# Patient Record
Sex: FEMALE | Race: WHITE | NOT HISPANIC OR LATINO | URBAN - METROPOLITAN AREA
[De-identification: names, ages, dates, MRNs, and addresses within clinical notes are randomized per-mention and may not be internally consistent; named-entity substitution may affect disease eponyms.]

---

## 2023-11-30 DIAGNOSIS — Z30.41 USES ORAL CONTRACEPTIVES: Primary | ICD-10-CM

## 2023-11-30 RX ORDER — DESOGESTREL/ETHINYL ESTRADIOL AND ETHINYL ESTRADIOL 21-5 (28)
KIT ORAL
Qty: 28 TABLET | Refills: 11 | Status: SHIPPED | OUTPATIENT
Start: 2023-11-30 | End: 2024-03-13 | Stop reason: WASHOUT

## 2024-02-12 ENCOUNTER — TELEPHONE (OUTPATIENT)
Dept: OBSTETRICS AND GYNECOLOGY | Facility: CLINIC | Age: 34
End: 2024-02-12
Payer: COMMERCIAL

## 2024-02-12 DIAGNOSIS — R10.2 PELVIC PAIN: ICD-10-CM

## 2024-02-12 NOTE — TELEPHONE ENCOUNTER
Est pt last seen 07/06/2022 Annual / pt calling with c/o left sie pelvic pain  / pt states that in Dec was seen in ER for right side pelvic pain / was told she had Rt ovarian cysts / pain subsided / Last week on menses had left side pelvic pain rates 7/10 some relief with heating pad and Tylenol was on menses 02/06-02/09 pain decreased after menses over now just a dull ache / advised message to Dr Fowler

## 2024-02-14 NOTE — TELEPHONE ENCOUNTER
Spoke to pt advised need for us/ advised office will call when us order placed / advised will also make f/up appt with WC after us sched /  order pended to Dr Fowler

## 2024-02-16 NOTE — TELEPHONE ENCOUNTER
Attempted to reach pt  left detailed message  advised that US order has been place / to  call cs to sched / number given / advised to call office with us date so f/up appt can be sched with WC.

## 2024-02-19 NOTE — TELEPHONE ENCOUNTER
Spoke to pt / pt states that she will call today for us appt / pt aware to call office with us date so f/up can be sched

## 2024-02-21 ENCOUNTER — HOSPITAL ENCOUNTER (OUTPATIENT)
Dept: RADIOLOGY | Facility: HOSPITAL | Age: 34
Discharge: HOME | End: 2024-02-21
Payer: COMMERCIAL

## 2024-02-21 DIAGNOSIS — R10.2 PELVIC PAIN: ICD-10-CM

## 2024-02-21 PROCEDURE — 76856 US EXAM PELVIC COMPLETE: CPT

## 2024-02-27 ENCOUNTER — APPOINTMENT (OUTPATIENT)
Dept: OBSTETRICS AND GYNECOLOGY | Facility: CLINIC | Age: 34
End: 2024-02-27

## 2024-03-12 NOTE — PROGRESS NOTES
Subjective   Kathy Urrutia is a 34 y.o. female who is here for follow up pain episode  Complaints:   episode R abdom pain to ER 12/18/23; then called 2/21/2024 w LLQ pain; completed pelvic ultrasound=2/21/2024 which showed NL gyn anatomy.  PMHx: acne/Tobacco use; 2024 weaning.  Surgical History: none   Review of Systems   Constitutional:  Negative for activity change, fatigue and unexpected weight change.   Respiratory:  Negative for chest tightness and shortness of breath.    Cardiovascular:  Negative for chest pain and leg swelling.   Gastrointestinal:  Negative for abdominal distention and abdominal pain.   Genitourinary:  Negative for difficulty urinating, dysuria, genital sores, pelvic pain, vaginal bleeding, vaginal discharge and vaginal pain.   Musculoskeletal:  Negative for gait problem and joint swelling.   Skin:  Negative for color change and rash.   Neurological:  Negative for dizziness, weakness and headaches.   Hematological:  Negative for adenopathy.   Objective   Visit Vitals  /60   Ht 1.524 m (5')   Wt 74.4 kg (164 lb)   LMP 03/05/2024 (Exact Date)   BMI 32.03 kg/m²   OB Status Having periods   Smoking Status Some Days   BSA 1.77 m²       General:   Alert and oriented, in no acute distress   Neck: Supple. No visible thyromegaly.    Breast/Axilla:     Abdomen: Soft, non-tender, without masses or organomegaly   Vulva: Normal architecture without erythema, masses, or lesions.    Vagina: Normal mucosa without lesions, masses, or atrophy. No abnormal vaginal discharge.    Cervix: Normal without masses, lesions, or signs of cervicitis   Uterus: Normal, mobile, non-enlarged uterus   Adnexa: No palp  masses or tenderness   Pelvic Floor normal   Psych Normal affect. Normal mood.    Assessment/Plan   Encounter Diagnosis   Name Primary?    Pelvic pain; current exam/ultrasound neg . Hx most c/w ovulation pain. Pt wanting to reminoff ocps for conception attempts.  Advised need to wean off nicotine for  pregnancy health. Yes      Alba Fowler MD

## 2024-03-13 ENCOUNTER — OFFICE VISIT (OUTPATIENT)
Dept: OBSTETRICS AND GYNECOLOGY | Facility: CLINIC | Age: 34
End: 2024-03-13
Payer: COMMERCIAL

## 2024-03-13 VITALS
WEIGHT: 164 LBS | SYSTOLIC BLOOD PRESSURE: 116 MMHG | BODY MASS INDEX: 32.2 KG/M2 | DIASTOLIC BLOOD PRESSURE: 60 MMHG | HEIGHT: 60 IN

## 2024-03-13 DIAGNOSIS — R10.2 PELVIC PAIN: Primary | ICD-10-CM

## 2024-03-13 PROCEDURE — 99213 OFFICE O/P EST LOW 20 MIN: CPT | Performed by: OBSTETRICS & GYNECOLOGY

## 2024-03-13 RX ORDER — SERTRALINE HYDROCHLORIDE 25 MG/1
25 TABLET, FILM COATED ORAL DAILY
COMMUNITY

## 2024-03-13 ASSESSMENT — PAIN SCALES - GENERAL: PAINLEVEL: 0-NO PAIN

## 2024-03-13 ASSESSMENT — ENCOUNTER SYMPTOMS
HEADACHES: 0
ABDOMINAL PAIN: 0
CHEST TIGHTNESS: 0
FATIGUE: 0
WEAKNESS: 0
ABDOMINAL DISTENTION: 0
SHORTNESS OF BREATH: 0
DIZZINESS: 0
UNEXPECTED WEIGHT CHANGE: 0
COLOR CHANGE: 0
DYSURIA: 0
DEPRESSION: 0
LOSS OF SENSATION IN FEET: 0
DIFFICULTY URINATING: 0
ADENOPATHY: 0
JOINT SWELLING: 0
ACTIVITY CHANGE: 0
OCCASIONAL FEELINGS OF UNSTEADINESS: 0

## 2024-03-13 ASSESSMENT — LIFESTYLE VARIABLES
HOW MANY STANDARD DRINKS CONTAINING ALCOHOL DO YOU HAVE ON A TYPICAL DAY: 1 OR 2
HOW OFTEN DO YOU HAVE SIX OR MORE DRINKS ON ONE OCCASION: NEVER
HOW OFTEN DO YOU HAVE A DRINK CONTAINING ALCOHOL: 2-3 TIMES A WEEK
SKIP TO QUESTIONS 9-10: 1
AUDIT-C TOTAL SCORE: 3

## 2024-03-13 ASSESSMENT — PATIENT HEALTH QUESTIONNAIRE - PHQ9
SUM OF ALL RESPONSES TO PHQ9 QUESTIONS 1 & 2: 0
1. LITTLE INTEREST OR PLEASURE IN DOING THINGS: NOT AT ALL
2. FEELING DOWN, DEPRESSED OR HOPELESS: NOT AT ALL